# Patient Record
(demographics unavailable — no encounter records)

---

## 2025-03-31 NOTE — HISTORY OF PRESENT ILLNESS
[FreeTextEntry1] : Acne [de-identified] : Follow-up visit for 16-year-old white female last seen by me on July 29, 2024, presenting with a flare of acne. On no current treatment.  Note: Patient has history of perioral dermatitis treated with doxycycline and metronidazole cream 0.75% in the past.  Menses regular.  Flares before menses.

## 2025-03-31 NOTE — PLAN
[TextEntry] : Start doxycycline 100 mg p.o. twice daily (patient cannot swallow pills-takes contents of capsules -warned about potential photosensitivity Start 10% benzoyl peroxide wash in shower Start metronidazole cream 0.75% to face twice a day  Return 3 months

## 2025-03-31 NOTE — PHYSICAL EXAM
[Alert] : alert [Oriented x 3] : ~L oriented x 3 [Well Nourished] : well nourished [FreeTextEntry3] : Face: Mild to moderate clustered very small papules-especially left sides of nose and chin with few larger pustules present on the forehead Mild to moderate comedones-especially forehead